# Patient Record
Sex: FEMALE | Race: WHITE | NOT HISPANIC OR LATINO | Employment: STUDENT | URBAN - METROPOLITAN AREA
[De-identification: names, ages, dates, MRNs, and addresses within clinical notes are randomized per-mention and may not be internally consistent; named-entity substitution may affect disease eponyms.]

---
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
## 2024-01-23 ENCOUNTER — OFFICE VISIT (OUTPATIENT)
Dept: URGENT CARE | Facility: CLINIC | Age: 19
End: 2024-01-23
Payer: COMMERCIAL

## 2024-01-23 VITALS
WEIGHT: 120 LBS | SYSTOLIC BLOOD PRESSURE: 110 MMHG | OXYGEN SATURATION: 99 % | HEIGHT: 66 IN | DIASTOLIC BLOOD PRESSURE: 73 MMHG | HEART RATE: 94 BPM | BODY MASS INDEX: 19.29 KG/M2 | RESPIRATION RATE: 18 BRPM | TEMPERATURE: 99 F

## 2024-01-23 DIAGNOSIS — U07.1 COVID-19: Primary | ICD-10-CM

## 2024-01-23 DIAGNOSIS — R68.83 CHILLS: ICD-10-CM

## 2024-01-23 DIAGNOSIS — R50.9 FEVER, UNSPECIFIED FEVER CAUSE: ICD-10-CM

## 2024-01-23 LAB
CTP QC/QA: YES
SARS-COV-2 AG RESP QL IA.RAPID: POSITIVE

## 2024-01-23 PROCEDURE — 99203 OFFICE O/P NEW LOW 30 MIN: CPT | Mod: S$GLB,,,

## 2024-01-23 PROCEDURE — 87811 SARS-COV-2 COVID19 W/OPTIC: CPT | Mod: QW,S$GLB,,

## 2024-01-23 NOTE — LETTER
"Colleen"Riley Lopez was seen and treated in our Urgent Care on 1/23/2024.     Colleen Lopez has met the criteria for COVID-19 testing and has a POSITIVE result. She can return to work once they are asymptomatic for 24 hours without the use of fever reducing medications AND at least five days from the first positive result. A mask is recommended for 5 days post quarantine.     If you have any questions or concerns, or if I can be of further assistance, please do not hesitate to contact me.    Sincerely,     Rosie Emmanuel PA-C  "

## 2024-01-23 NOTE — PROGRESS NOTES
"Subjective:      Patient ID: Colleen Lopez is a 19 y.o. female.    Vitals:  height is 5' 6" (1.676 m) and weight is 54.4 kg (120 lb). Her temperature is 98.6 °F (37 °C). Her blood pressure is 110/73 and her pulse is 94. Her respiration is 18 and oxygen saturation is 99%.     Chief Complaint: Fever    Colleen Lopez is a 19 y.o. female who presents for fever (Tmax 101 F) which onset yesterday. Associated sxs include cough, congestion, ear pain, HA, generalized myalgias, and nausea. Patient denies any chills, SOB, CP, abd pain, v/d, rash, dizziness, or numbness/tingling. Prior Tx includes mucinex and tylenol. No known exposures.    Fever   This is a new problem. The current episode started yesterday. The problem occurs constantly. The problem has been gradually worsening. The maximum temperature noted was 101 to 101.9 F. The temperature was taken using an oral thermometer. Associated symptoms include congestion, coughing, ear pain, headaches, muscle aches and nausea. Pertinent negatives include no abdominal pain, chest pain, diarrhea, rash, sleepiness, sore throat, urinary pain, vomiting or wheezing. She has tried acetaminophen (mucinex) for the symptoms. The treatment provided mild relief.   Risk factors: no contaminated food, no contaminated water, no hx of cancer, no immunosuppression, no occupational exposure, no recent sickness, no recent travel and no sick contacts      Constitution: Positive for fever. Negative for appetite change, chills, sweating and fatigue.   HENT:  Positive for ear pain and congestion. Negative for ear discharge, hearing loss, postnasal drip, sinus pain, sinus pressure, sore throat and trouble swallowing.    Cardiovascular:  Negative for chest pain.   Respiratory:  Positive for cough. Negative for sputum production, shortness of breath and wheezing.    Gastrointestinal:  Positive for nausea. Negative for abdominal pain, vomiting and diarrhea.   Genitourinary:  Negative for " dysuria.   Musculoskeletal:  Negative for muscle ache.   Skin:  Negative for rash.   Neurological:  Positive for headaches. Negative for dizziness, numbness and tingling.      Objective:     Physical Exam   Constitutional: She is oriented to person, place, and time. She appears well-developed. She is cooperative.  Non-toxic appearance. She does not appear ill. No distress.   HENT:   Head: Normocephalic and atraumatic.   Ears:   Right Ear: Hearing, tympanic membrane, external ear and ear canal normal.   Left Ear: Hearing, tympanic membrane, external ear and ear canal normal.   Nose: Nose normal. No mucosal edema or nasal deformity. No epistaxis. Right sinus exhibits no maxillary sinus tenderness and no frontal sinus tenderness. Left sinus exhibits no maxillary sinus tenderness and no frontal sinus tenderness.   Mouth/Throat: Uvula is midline, oropharynx is clear and moist and mucous membranes are normal. Mucous membranes are moist. No trismus in the jaw. Normal dentition. No uvula swelling. No oropharyngeal exudate, posterior oropharyngeal edema or posterior oropharyngeal erythema.   Eyes: Conjunctivae and lids are normal. No scleral icterus. Extraocular movement intact   Neck: Trachea normal and phonation normal. Neck supple. No edema present. No erythema present. No neck rigidity present.   Cardiovascular: Normal rate, regular rhythm, normal heart sounds and normal pulses.   Pulmonary/Chest: Effort normal and breath sounds normal. No stridor. No respiratory distress. She has no decreased breath sounds. She has no wheezes. She has no rhonchi. She has no rales.   Abdominal: Normal appearance.   Musculoskeletal: Normal range of motion.         General: No deformity. Normal range of motion.   Neurological: She is alert and oriented to person, place, and time. She exhibits normal muscle tone. Coordination normal.   Skin: Skin is warm, dry, intact, not diaphoretic and not pale.   Psychiatric: Her speech is normal and  behavior is normal. Judgment and thought content normal.   Nursing note and vitals reviewed.      Assessment:     1. COVID-19    2. Fever, unspecified fever cause    3. Chills        Results for orders placed or performed in visit on 01/23/24   SARS Coronavirus 2 Antigen, POCT Manual Read   Result Value Ref Range    SARS Coronavirus 2 Antigen Positive (A) Negative     Acceptable Yes        Plan:       COVID-19    Fever, unspecified fever cause  -     SARS Coronavirus 2 Antigen, POCT Manual Read    Chills  -     SARS Coronavirus 2 Antigen, POCT Manual Read      VSS. Afebrile. Patient is in NAD.  COVID-19 Risk Score = 0  Chart reviewed.  Isolation precautions given. Discussed 5 day isolation period from first (+) test. Patient verbalizes understanding.  Discussed risk and benefits of Paxlovid therapy. Paxlovid offered. However, patient does not meet qualifications to begin antiviral therapy.  Discussed positive COVID-19 results with patient. Get plenty of rest and drink plenty of fluids. Advised patient to return here or go to the Emergency Department for any concerns or worsening of condition. Advised patient to take tylenol (acetominophen) as permitted for fever, chills or body aches every 4 hours but not to exceed 4000 mg/day. Patient has no questions or concerns at this time, all questions were answered before discharge. Patient given handout with discharge instructions.  ER precautions given such as persistent fever, SOB, or CP.

## 2024-01-23 NOTE — PATIENT INSTRUCTIONS
Your test was POSITIVE for COVID-19 (coronavirus).       Please isolate yourself at home.  You may leave home and/or return to work once the following conditions are met:    If you were not hospitalized and are not moderately to severely immunocompromised:   More than 5 days since symptoms first appeared AND  More than 24 hours fever free without medications AND  Symptoms are improving  Continue to wear a mask around others for 5 additional days.    If you were hospitalized OR are moderately to severely immunocompromised:  More than 20 days since symptoms first appeared  More than 24 hours fever free without medications  Symptoms have improved    If you had no symptoms but tested positive:  More than 5 days since the date of the first positive test (20 days if moderately to severely immunocompromised). If you develop symptoms, then use the guidelines above.  Continue to wear a mask around others for 5 additional days.      Contact Tracing    As one of the next steps, you will receive a call or text from the [place] Department of Health ([facility]) COVID-19 Tracing Team. See the contact information below so you know not to ignore the health departments call. It is important that you contact them back immediately so they can help.      Contact Tracer Number:  [phone]  Caller ID for most carriers: [facility]     What is contact tracing?  Contact tracing is a process that helps identify everyone who has been in close contact with an infected person. Contact tracers let those people know they may have been exposed and guide them on next steps. Confidentiality is important for everyone; no one will be told who may have exposed them to the virus.  Your involvement is important. The more we know about where and how this virus is spreading, the better chance we have at stopping it from spreading further.  What does exposure mean?  Exposure means you have been within 6 feet for more than 15 minutes with a person who  has or had COVID-19.  What kind of questions do the contact tracers ask?  A contact tracer will confirm your basic contact information including name, address, phone number, and next of kin, as well as asking about any symptoms you may have had. Theyll also ask you how you think you may have gotten sick, such as places where you may have been exposed to the virus, and people you were with. Those names will never be shared with anyone outside of that call, and will only be used to help trace and stop the spread of the virus.   I have privacy concerns. How will the state use my information?  Your privacy about your health is important. All calls are completed using call centers that use the appropriate health privacy protection measures (HIPAA compliance), meaning that your patient information is safe. No one will ever ask you any questions related to immigration status. Your health comes first.   Do I have to participate?  You do not have to participate, but we strongly encourage you to. Contact tracing can help us catch and control new outbreaks as theyre developing to keep your friends and family safe.   What if I dont hear from anyone?  If you dont receive a call within 24 hours, you can call the number above right away to inquire about your status. That line is open from 8:00 am - 8:00 p.m., 7 days a week.  Contact tracing saves lives! Together, we have the power to beat this virus and keep our loved ones and neighbors safe.    For more information see CDC link below.      https://www.cdc.gov/coronavirus/2019-ncov/hcp/guidance-prevent-spread.html#precautions        Sources:  [facility]         PLEASE READ YOUR DISCHARGE INSTRUCTIONS ENTIRELY AS IT CONTAINS IMPORTANT INFORMATION.    Please drink plenty of fluids.    Please get plenty of rest.    Please return here or go to the Emergency Department for any concerns or worsening of condition.    Please take an over the  counter antihistamine medication (Allegra/Claritin/Zyrtec) of your choice as directed for allergy symptoms and/or runny nose and postnasal drip.    Try an over the counter decongestant for sinus pressure/ear pressure, congestion symptoms like Mucinex D or Sudafed or Phenylephrine. You buy this behind the pharmacy counter.    If you do have Hypertension or palpitations, it is safe to take Coricidin HBP for relief of sinus symptoms.    Tylenol or ibuprofen can also be used as directed for pain and fever unless you have an allergy to them or medical condition such as stomach ulcers, kidney or liver disease or blood thinners etc for which you should not be taking these type of medications.     Sore throat recommendations: Warm fluids, warm salt water gargles, throat lozenges, tea, honey, soup, rest, hydration.    Use over the counter Flonase or Nasocort: one spray each nostril twice daily OR two sprays each nostril once daily until nares dry out, unless you have Glaucoma.   Can also supplement with nasal saline rinse.    Sinus rinses DO NOT USE TAP WATER, if you must, water must be at a rolling boil for 1 minute, let it cool, then use.  May use distilled water, or over the counter nasal saline rinses.  Osito's vapor rub in shower to help open nasal passages.  May use nasal gel to keep passages moisturized.  May use nasal saline sprays during the day for added relief of congestion.   For those who go to the gym, please do not use the sauna or steam room now to clear sinuses.    Cough     Rest and fluids are important  Can use honey with ivanna to soothe your throat    Robitussin or Delsyum for cough suppressant for dry cough.    Mucinex DM or products containing Guaifenesin or Dextromethorphan for expectorant (wet cough).    Take prescription cough meds (pills) as prescribed; take prescription cough syrup at night as needed for cough.  Do not take both the prescribed cough pills and syrup at the same time or within 6  hours of each other.  Do not take the cough syrup with any other sedative medication as it can can cause drowsiness. Do not operate any heavy machinery, drink or drive while taking the cough syrup.     Please follow up with your primary care doctor or specialist in the next 48-72hrs as needed and if no improvement    If you smoke, please stop smoking.    Please return or see your primary care doctor if you develop new or worsening symptoms.     Lastly, good hand washing and cough hygiene (cough into your elbow) will help prevent the spread of the illness. A general rule is that you are no longer contagious once you have been without a fever for over 24 hours without requiring fever reducing medications.     Please arrange follow up with your primary medical clinic as soon as possible. You must understand that you've received an Urgent Care treatment only and that you may be released before all of your medical problems are known or treated. You, the patient, will arrange for follow up as instructed. If your symptoms worsen or fail to improve you should go to the Emergency Room.

## 2024-05-03 ENCOUNTER — OFFICE VISIT (OUTPATIENT)
Dept: URGENT CARE | Facility: CLINIC | Age: 19
End: 2024-05-03
Payer: COMMERCIAL

## 2024-05-03 VITALS
DIASTOLIC BLOOD PRESSURE: 61 MMHG | OXYGEN SATURATION: 99 % | RESPIRATION RATE: 18 BRPM | BODY MASS INDEX: 18.42 KG/M2 | HEART RATE: 77 BPM | TEMPERATURE: 98 F | HEIGHT: 66 IN | SYSTOLIC BLOOD PRESSURE: 115 MMHG | WEIGHT: 114.63 LBS

## 2024-05-03 DIAGNOSIS — J01.90 ACUTE BACTERIAL SINUSITIS: Primary | ICD-10-CM

## 2024-05-03 DIAGNOSIS — B96.89 ACUTE BACTERIAL SINUSITIS: Primary | ICD-10-CM

## 2024-05-03 PROCEDURE — 99213 OFFICE O/P EST LOW 20 MIN: CPT | Mod: S$GLB,,, | Performed by: PHYSICIAN ASSISTANT

## 2024-05-03 RX ORDER — DOXYCYCLINE 100 MG/1
100 CAPSULE ORAL 2 TIMES DAILY
Qty: 14 CAPSULE | Refills: 0 | Status: SHIPPED | OUTPATIENT
Start: 2024-05-03 | End: 2024-05-10

## 2024-05-03 NOTE — PROGRESS NOTES
"Subjective:      Patient ID: Colleen Lopez is a 19 y.o. female.    Vitals:  height is 5' 6" (1.676 m) and weight is 52 kg (114 lb 10.2 oz). Her tympanic temperature is 98 °F (36.7 °C). Her blood pressure is 115/61 and her pulse is 77. Her respiration is 18 and oxygen saturation is 99%.     Chief Complaint: Sinus Problem    Patient presents with cough, congestion, rhinorrhea with yellow snot, sore throat. OTC - Mucinex. On set of symptoms last Saturday 04/27/2024. Patient denies exposure at home, school or work.  Denies fever, body aches, or chills.  +sinus pressure and enlarged lymph nodes per patient.    Sinus Problem  This is a new problem. The current episode started in the past 7 days. The problem is unchanged. There has been no fever. She is experiencing no pain. Associated symptoms include congestion, coughing, sinus pressure, sneezing and a sore throat. Pertinent negatives include no chills, diaphoresis, ear pain, headaches, hoarse voice, neck pain, shortness of breath or swollen glands. Treatments tried: Musinex. The treatment provided mild relief.       Constitution: Negative for chills and sweating.   HENT:  Positive for congestion, sinus pressure and sore throat. Negative for ear pain.    Neck: Negative for neck pain.   Respiratory:  Positive for cough. Negative for shortness of breath.    Allergic/Immunologic: Positive for sneezing.   Neurological:  Negative for headaches.      Objective:     Physical Exam   Constitutional: She is oriented to person, place, and time. She appears well-developed.   HENT:   Head: Normocephalic and atraumatic.   Ears:   Right Ear: Hearing, tympanic membrane, external ear and ear canal normal.   Left Ear: Hearing, tympanic membrane, external ear and ear canal normal.   Nose: Mucosal edema and rhinorrhea present.   Mouth/Throat: Uvula is midline, oropharynx is clear and moist and mucous membranes are normal. Mucous membranes are moist. Cobblestoning present. No " tonsillar exudate.   Eyes: Conjunctivae and EOM are normal. Pupils are equal, round, and reactive to light.   Neck: Neck supple.   Cardiovascular: Normal rate, regular rhythm, normal heart sounds and normal pulses.   Pulmonary/Chest: Effort normal and breath sounds normal.   Musculoskeletal: Normal range of motion.         General: Normal range of motion.   Lymphadenopathy:     She has cervical adenopathy.   Neurological: She is alert and oriented to person, place, and time.   Skin: Skin is warm and dry.   Vitals reviewed.      Assessment:     1. Acute bacterial sinusitis        Plan:       Acute bacterial sinusitis  -     doxycycline (VIBRAMYCIN) 100 MG Cap; Take 1 capsule (100 mg total) by mouth 2 (two) times daily. for 7 days  Dispense: 14 capsule; Refill: 0      Patient Instructions     Advise increase p.o. fluids--at least 64 ounces of water/juice & rest  Meds:  Doxycycline sent to pharmacy.  Recommend to start Flonase and antihistamine OTC.  Advise complete antibiotics.  Normal saline nasal wash to irrigate sinuses and for congestion/runny nose.  Cool mist humidifier/vaporizer.  Practice good handwashing.  Mucinex for cough and chest congestion.  Tylenol or Ibuprofen for fever, headache and body aches.  Warm salt water gargles for throat comfort.  Chloraseptic spray or lozenges for throat comfort.  See PCP or go to ER if symptoms worsen or fail to improve with treatment.

## 2024-05-03 NOTE — PATIENT INSTRUCTIONS
Advise increase p.o. fluids--at least 64 ounces of water/juice & rest  Meds:  Doxycycline sent to pharmacy.  Recommend to start Flonase and antihistamine OTC.  Advise complete antibiotics.  Normal saline nasal wash to irrigate sinuses and for congestion/runny nose.  Cool mist humidifier/vaporizer.  Practice good handwashing.  Mucinex for cough and chest congestion.  Tylenol or Ibuprofen for fever, headache and body aches.  Warm salt water gargles for throat comfort.  Chloraseptic spray or lozenges for throat comfort.  See PCP or go to ER if symptoms worsen or fail to improve with treatment.

## 2024-12-14 ENCOUNTER — OFFICE VISIT (OUTPATIENT)
Dept: URGENT CARE | Facility: CLINIC | Age: 19
End: 2024-12-14
Payer: COMMERCIAL

## 2024-12-14 VITALS
WEIGHT: 114.5 LBS | DIASTOLIC BLOOD PRESSURE: 74 MMHG | OXYGEN SATURATION: 99 % | TEMPERATURE: 99 F | SYSTOLIC BLOOD PRESSURE: 119 MMHG | RESPIRATION RATE: 18 BRPM | HEART RATE: 84 BPM | BODY MASS INDEX: 18.4 KG/M2 | HEIGHT: 66 IN

## 2024-12-14 DIAGNOSIS — M62.830 BACK MUSCLE SPASM: ICD-10-CM

## 2024-12-14 DIAGNOSIS — M54.6 ACUTE RIGHT-SIDED THORACIC BACK PAIN: Primary | ICD-10-CM

## 2024-12-14 PROCEDURE — 71101 X-RAY EXAM UNILAT RIBS/CHEST: CPT | Mod: RT,S$GLB,, | Performed by: STUDENT IN AN ORGANIZED HEALTH CARE EDUCATION/TRAINING PROGRAM

## 2024-12-14 PROCEDURE — 99213 OFFICE O/P EST LOW 20 MIN: CPT | Mod: S$GLB,,, | Performed by: FAMILY MEDICINE

## 2024-12-14 NOTE — PROGRESS NOTES
"Subjective:      Patient ID: Shea Hoffman is a 19 y.o. female.    Vitals:  height is 5' 6" (1.676 m) and weight is 51.9 kg (114 lb 8.5 oz). Her oral temperature is 99.4 °F (37.4 °C). Her blood pressure is 119/74 and her pulse is 84. Her respiration is 18 and oxygen saturation is 99%.     Chief Complaint: Back Pain (Knot on right side of back/)    20 yo female patient presents with a possibly a knot on right side of middle back. No injury or fall. She has had back pain in this area on both sides and some muscle spasm which started about 2 weeks ago. When rubbing the back it seemed like maybe a knot just below the ribs on the right. No associating symptoms. No medication/intervention.     Back Pain  This is a new problem. Episode onset: 2 weeks ago pain/ 3 days ago feeling the knot. The pain is at a severity of 4/10. The pain is mild. The pain is The same all the time. The symptoms are aggravated by lying down, bending, sitting, standing and twisting. Pertinent negatives include no abdominal pain, bladder incontinence, bowel incontinence, chest pain, dysuria, fever, headaches, leg pain, numbness, paresis, paresthesias, pelvic pain, perianal numbness, tingling, weakness or weight loss. She has tried nothing for the symptoms. The treatment provided no relief.       Constitution: Negative. Negative for fever.   HENT: Negative.     Cardiovascular: Negative.  Negative for chest pain.   Eyes: Negative.    Respiratory: Negative.     Gastrointestinal: Negative.  Negative for abdominal pain and bowel incontinence.   Genitourinary: Negative.  Negative for dysuria, bladder incontinence and pelvic pain.   Musculoskeletal:  Positive for back pain and muscle ache. Negative for trauma and history of spine disorder.   Skin:  Negative for rash.   Neurological:  Negative for headaches and numbness.      Objective:     Physical Exam   Constitutional: She is oriented to person, place, and time.   HENT:   Nose: Nose normal. "   Mouth/Throat: Oropharynx is clear.   Eyes: Conjunctivae are normal.   Neck: Neck supple.   Cardiovascular: Normal rate, regular rhythm, normal heart sounds and normal pulses.   Pulmonary/Chest: Effort normal and breath sounds normal.   Abdominal: Normal appearance. She exhibits no distension. Soft. There is no abdominal tenderness.   Musculoskeletal: Normal range of motion.         General: Normal range of motion.      Comments: Mild spasm along mid to lower thoracic paraspinal muscles. Mild tenderness over the lower edge of the right lower rib cage. No specific mass or soft tissue able to be appreciated on exam today. No discoloration or warmth. No wounds or rash.    Neurological: no focal deficit. She is alert and oriented to person, place, and time.   Skin: Skin is warm and no rash.         Comments: Normal turgor   Psychiatric: Her behavior is normal. Mood, judgment and thought content normal.   Nursing note and vitals reviewed.      Assessment:     1. Acute right-sided thoracic back pain    2. Back muscle spasm        Plan:       Acute right-sided thoracic back pain  -     XR RIB RIGHT W/ PA CHEST; Future; Expected date: 12/14/2024    Back muscle spasm        Narrative & Impression  EXAM: XR RIBS MIN 3 VIEWS W/ PA CHEST RIGHT     CLINICAL HISTORY: feels knot right lower thoracic/lowest rib area; [M54.6]-Pain in thoracic spine.     COMPARISON: None.     FINDINGS:  No focal airspace disease process. No pleural effusion or pneumothorax.  Normal size of the cardiac silhouette.  No acute osseous or soft tissue abnormality. No acute displaced rib fracture or rib abnormality.        Impression:     No acute cardiopulmonary abnormality.  No displaced rib fracture.     Finalized on: 12/14/2024 11:17 AM By:  Artemio Castellon MD  [facility]# [id number]      2024-12-14 11:19:46.776    [facility]        SUMMARY: See hpi. No specific palpable mass/lesion appreciated. Discussed radiology report. Xray limited so if symptoms continue, may been  more detailed testing. For muscle spasm, see below. Has been studying/finals which may have contributed. Immediate medical provider evaluation if worsens or new symptoms. Followup here as needed.     Patient Instructions   Thank you for allowing our team to take care of you today.  Radiology read on xray with no acute findings. If symptoms persist a more detailed test may be needed like CT.   Unable to palpate any obvious mass on exam. There is muscle spasm on both sides of the back as you have been feeling.  Ibuprofen 2 pills twice a day for four days then as needed.  Heating pad to help for spasm. Medium to low heat 3 to 4 times per day.  Limit any aggravating movements.  Immediate medical provider evaluation if worsens.  Followup here as needed.       XRAY  FINDINGS:  No focal airspace disease process. No pleural effusion or pneumothorax.  Normal size of the cardiac silhouette.  No acute osseous or soft tissue abnormality. No acute displaced rib fracture or rib abnormality.        Impression:     No acute cardiopulmonary abnormality.  No displaced rib fracture.     Finalized on: 12/14/2024 11:17 AM By:  Artemio Castellon MD  [facility]# [id number]      2024-12-14 11:19:46.776    JACE

## 2024-12-14 NOTE — PATIENT INSTRUCTIONS
Thank you for allowing our team to take care of you today.  Radiology read on xray with no acute findings. If symptoms persist a more detailed test may be needed like CT.   Unable to palpate any obvious mass on exam. There is muscle spasm on both sides of the back as you have been feeling.  Ibuprofen 2 pills twice a day for four days then as needed.  Heating pad to help for spasm. Medium to low heat 3 to 4 times per day.  Limit any aggravating movements.  Immediate medical provider evaluation if worsens.  Followup here as needed.       XRAY  FINDINGS:  No focal airspace disease process. No pleural effusion or pneumothorax.  Normal size of the cardiac silhouette.  No acute osseous or soft tissue abnormality. No acute displaced rib fracture or rib abnormality.        Impression:     No acute cardiopulmonary abnormality.  No displaced rib fracture.     Finalized on: 12/14/2024 11:17 AM By:  Artemio Castellon MD  [facility]# [id number]      2024-12-14 11:19:46.776    JACE